# Patient Record
Sex: FEMALE | Race: WHITE | NOT HISPANIC OR LATINO | Employment: OTHER | URBAN - METROPOLITAN AREA
[De-identification: names, ages, dates, MRNs, and addresses within clinical notes are randomized per-mention and may not be internally consistent; named-entity substitution may affect disease eponyms.]

---

## 2018-10-05 ENCOUNTER — HOSPITAL ENCOUNTER (EMERGENCY)
Facility: HOSPITAL | Age: 53
Discharge: HOME | End: 2018-10-05
Attending: EMERGENCY MEDICINE | Admitting: EMERGENCY MEDICINE
Payer: MEDICARE

## 2018-10-05 ENCOUNTER — APPOINTMENT (EMERGENCY)
Dept: CT IMAGING | Facility: HOSPITAL | Age: 53
End: 2018-10-05
Payer: MEDICARE

## 2018-10-05 VITALS
TEMPERATURE: 98.3 F | SYSTOLIC BLOOD PRESSURE: 127 MMHG | BODY MASS INDEX: 24.27 KG/M2 | DIASTOLIC BLOOD PRESSURE: 72 MMHG | HEIGHT: 66 IN | HEART RATE: 60 BPM | OXYGEN SATURATION: 98 % | WEIGHT: 151 LBS | RESPIRATION RATE: 18 BRPM

## 2018-10-05 DIAGNOSIS — R91.1 LUNG NODULE: ICD-10-CM

## 2018-10-05 DIAGNOSIS — R10.9 RIGHT FLANK PAIN: Primary | ICD-10-CM

## 2018-10-05 DIAGNOSIS — N60.11 CYSTIC BREAST, RIGHT: ICD-10-CM

## 2018-10-05 DIAGNOSIS — T14.8XXA MUSCLE STRAIN: ICD-10-CM

## 2018-10-05 LAB
ALBUMIN SERPL BCP-MCNC: 3.9 G/DL (ref 3.5–5)
ALP SERPL-CCNC: 70 U/L (ref 46–116)
ALT SERPL W P-5'-P-CCNC: 41 U/L (ref 12–78)
ANION GAP SERPL CALCULATED.3IONS-SCNC: 10 MMOL/L (ref 4–13)
AST SERPL W P-5'-P-CCNC: 20 U/L (ref 5–45)
BASOPHILS # BLD AUTO: 0.04 THOUSANDS/ΜL (ref 0–0.1)
BASOPHILS NFR BLD AUTO: 1 % (ref 0–1)
BILIRUB SERPL-MCNC: 0.9 MG/DL (ref 0.2–1)
BILIRUB UR QL STRIP: ABNORMAL
BUN SERPL-MCNC: 15 MG/DL (ref 5–25)
CALCIUM SERPL-MCNC: 8.9 MG/DL (ref 8.3–10.1)
CHLORIDE SERPL-SCNC: 104 MMOL/L (ref 100–108)
CLARITY UR: ABNORMAL
CO2 SERPL-SCNC: 26 MMOL/L (ref 21–32)
COLOR UR: ABNORMAL
CREAT SERPL-MCNC: 0.86 MG/DL (ref 0.6–1.3)
EOSINOPHIL # BLD AUTO: 0.15 THOUSAND/ΜL (ref 0–0.61)
EOSINOPHIL NFR BLD AUTO: 2 % (ref 0–6)
ERYTHROCYTE [DISTWIDTH] IN BLOOD BY AUTOMATED COUNT: 13.2 % (ref 11.6–15.1)
GFR SERPL CREATININE-BSD FRML MDRD: 78 ML/MIN/1.73SQ M
GLUCOSE SERPL-MCNC: 107 MG/DL (ref 65–140)
GLUCOSE UR STRIP-MCNC: NEGATIVE MG/DL
HCT VFR BLD AUTO: 42.8 % (ref 34.8–46.1)
HGB BLD-MCNC: 14.7 G/DL (ref 11.5–15.4)
HGB UR QL STRIP.AUTO: NEGATIVE
IMM GRANULOCYTES # BLD AUTO: 0.03 THOUSAND/UL (ref 0–0.2)
IMM GRANULOCYTES NFR BLD AUTO: 0 % (ref 0–2)
KETONES UR STRIP-MCNC: NEGATIVE MG/DL
LEUKOCYTE ESTERASE UR QL STRIP: NEGATIVE
LIPASE SERPL-CCNC: 115 U/L (ref 73–393)
LYMPHOCYTES # BLD AUTO: 2.1 THOUSANDS/ΜL (ref 0.6–4.47)
LYMPHOCYTES NFR BLD AUTO: 30 % (ref 14–44)
MCH RBC QN AUTO: 33.3 PG (ref 26.8–34.3)
MCHC RBC AUTO-ENTMCNC: 34.3 G/DL (ref 31.4–37.4)
MCV RBC AUTO: 97 FL (ref 82–98)
MONOCYTES # BLD AUTO: 0.53 THOUSAND/ΜL (ref 0.17–1.22)
MONOCYTES NFR BLD AUTO: 8 % (ref 4–12)
NEUTROPHILS # BLD AUTO: 4.12 THOUSANDS/ΜL (ref 1.85–7.62)
NEUTS SEG NFR BLD AUTO: 59 % (ref 43–75)
NITRITE UR QL STRIP: NEGATIVE
NRBC BLD AUTO-RTO: 0 /100 WBCS
PH UR STRIP.AUTO: 6 [PH] (ref 4.5–8)
PLATELET # BLD AUTO: 236 THOUSANDS/UL (ref 149–390)
PMV BLD AUTO: 9.6 FL (ref 8.9–12.7)
POTASSIUM SERPL-SCNC: 3.2 MMOL/L (ref 3.5–5.3)
PROT SERPL-MCNC: 6.9 G/DL (ref 6.4–8.2)
PROT UR STRIP-MCNC: NEGATIVE MG/DL
RBC # BLD AUTO: 4.41 MILLION/UL (ref 3.81–5.12)
SODIUM SERPL-SCNC: 140 MMOL/L (ref 136–145)
SP GR UR STRIP.AUTO: >=1.03 (ref 1–1.03)
UROBILINOGEN UR QL STRIP.AUTO: 1 E.U./DL
WBC # BLD AUTO: 6.97 THOUSAND/UL (ref 4.31–10.16)

## 2018-10-05 PROCEDURE — 96374 THER/PROPH/DIAG INJ IV PUSH: CPT

## 2018-10-05 PROCEDURE — 99284 EMERGENCY DEPT VISIT MOD MDM: CPT

## 2018-10-05 PROCEDURE — 74177 CT ABD & PELVIS W/CONTRAST: CPT

## 2018-10-05 PROCEDURE — 71275 CT ANGIOGRAPHY CHEST: CPT

## 2018-10-05 PROCEDURE — 81003 URINALYSIS AUTO W/O SCOPE: CPT | Performed by: EMERGENCY MEDICINE

## 2018-10-05 PROCEDURE — 80053 COMPREHEN METABOLIC PANEL: CPT | Performed by: EMERGENCY MEDICINE

## 2018-10-05 PROCEDURE — 83690 ASSAY OF LIPASE: CPT | Performed by: EMERGENCY MEDICINE

## 2018-10-05 PROCEDURE — 85025 COMPLETE CBC W/AUTO DIFF WBC: CPT | Performed by: EMERGENCY MEDICINE

## 2018-10-05 PROCEDURE — 96361 HYDRATE IV INFUSION ADD-ON: CPT

## 2018-10-05 PROCEDURE — 36415 COLL VENOUS BLD VENIPUNCTURE: CPT | Performed by: EMERGENCY MEDICINE

## 2018-10-05 RX ORDER — KETOROLAC TROMETHAMINE 30 MG/ML
15 INJECTION, SOLUTION INTRAMUSCULAR; INTRAVENOUS ONCE
Status: COMPLETED | OUTPATIENT
Start: 2018-10-05 | End: 2018-10-05

## 2018-10-05 RX ORDER — LIDOCAINE 50 MG/G
1 PATCH TOPICAL ONCE
Status: DISCONTINUED | OUTPATIENT
Start: 2018-10-05 | End: 2018-10-05 | Stop reason: HOSPADM

## 2018-10-05 RX ORDER — POTASSIUM CHLORIDE 20 MEQ/1
40 TABLET, EXTENDED RELEASE ORAL ONCE
Status: COMPLETED | OUTPATIENT
Start: 2018-10-05 | End: 2018-10-05

## 2018-10-05 RX ORDER — ACETAMINOPHEN 325 MG/1
975 TABLET ORAL ONCE
Status: COMPLETED | OUTPATIENT
Start: 2018-10-05 | End: 2018-10-05

## 2018-10-05 RX ADMIN — LIDOCAINE 1 PATCH: 50 PATCH TOPICAL at 14:59

## 2018-10-05 RX ADMIN — IOHEXOL 100 ML: 350 INJECTION, SOLUTION INTRAVENOUS at 14:04

## 2018-10-05 RX ADMIN — SODIUM CHLORIDE 1000 ML: 0.9 INJECTION, SOLUTION INTRAVENOUS at 13:23

## 2018-10-05 RX ADMIN — POTASSIUM CHLORIDE 40 MEQ: 1500 TABLET, EXTENDED RELEASE ORAL at 14:59

## 2018-10-05 RX ADMIN — ACETAMINOPHEN 975 MG: 325 TABLET, FILM COATED ORAL at 14:59

## 2018-10-05 RX ADMIN — KETOROLAC TROMETHAMINE 15 MG: 30 INJECTION, SOLUTION INTRAMUSCULAR at 13:25

## 2018-10-05 NOTE — DISCHARGE INSTRUCTIONS
Incidental findings to follow up with PCP regarding:  - Left basilar 3 mm pulmonary nodule  Based on current Fleischner Society 2017 Guidelines on incidental pulmonary nodule, no routine follow-up is needed if the patient is considered low risk for lung cancer  If the patient is considered high risk for lung cancer, 12 month follow-up non-contrast chest CT is recommended  - Right lower breast lobulated cystic region measuring up to 2 3 x 3 3 x 3 4 cm  Correlate with mammography and possible ultrasound  Flank Pain   WHAT YOU NEED TO KNOW:   Flank pain is felt in the area below your ribcage and above your hip bones, often in the lower back  Your pain may be dull or so severe that you cannot get comfortable  The pain may stay in one area or radiate to another area  It may worsen and lighten in waves  Flank pain is often a sign of problems with your urinary tract, such as a kidney stone or infection  DISCHARGE INSTRUCTIONS:   Return to the emergency department if:   · You have a fever  · Your heart is fluttering or jumping  · You see blood in your urine  · Your pain radiates into your lower abdomen and genital area  · You have intense pain in your low back next to your spine  · You are much more tired than usual and have no desire to eat  · You have a headache and your muscles jerk  Contact your healthcare provider if:   · You have an upset stomach and are vomiting  · You have to urinate more often, and with urgency  · Your pain worsens or does not improve, and you cannot get comfortable  · You pass a stone when you urinate  · You have questions or concerns about your condition or care  Medicines: The following medicines may be ordered for you:  · Pain medicine  may help decrease or relieve your pain  Do not wait until the pain is severe before you take your medicine  · Antibiotics  may help treat a urinary tract infection caused by bacteria       · Take your medicine as directed  Contact your healthcare provider if you think your medicine is not helping or if you have side effects  Tell him of her if you are allergic to any medicine  Keep a list of the medicines, vitamins, and herbs you take  Include the amounts, and when and why you take them  Bring the list or the pill bottles to follow-up visits  Carry your medicine list with you in case of an emergency  Follow up with your healthcare provider in 1 to 2 days or as directed:  Write down your questions so you remember to ask them during your visits  © 2017 [employer] Information is for End User's use only and may not be sold, redistributed or otherwise used for commercial purposes  All illustrations and images included in CareNotes® are the copyrighted property of [employer]  or [employer]  The above information is an  only  It is not intended as medical advice for individual conditions or treatments  Talk to your doctor, nurse or pharmacist before following any medical regimen to see if it is safe and effective for you

## 2018-10-05 NOTE — ED PROVIDER NOTES
History  Chief Complaint   Patient presents with    Flank Pain     Pt reports right sided flank pain and diarrhea x5 days  Pt reports loss of her daughter 2 weeks prior  Pt reports it may be related to stress   Diarrhea     53-year-old female presenting for evaluation of right back/flank pain  Patient reports that symptoms started approximately 6 days ago  No inciting event, trauma or heavy lifting  She reports pain to right mid back/flank, does not radiate  Made worse with certain positions and movements  No alleviating factors  Never had similar pain in the past   Patient presents today because of the persistence of the symptoms, same severity over the past 6 days  Patient reports decreased appetite and not eating or drinking over the past few days  Denies any overt nausea  She does report numerous episodes of loose, nonbloody stools  Patient reports that she lost her daughter approximately 2 weeks ago and thought that this at first was a stress reaction, however symptoms have been persistent  Denies any fevers, chills, headache, CP, SOB, cough  She reports 1 episode of dysuria described as burning with urination yesterday, that resolved  Denies any hematuria, urgency or frequency  History of cholecystectomy, appendectomy  Lives with family at home  Current tobacco use  Denies any drug or alcohol use  A/P:  53-year-old female with right flank pain- will treat symptomatically, will get UA to rule out UTI/pyelonephritis, CBC to assess for leukocytosis/anemia, CMP to assess liver/renal function/electrolytes, lipase to rule pancreatitis, CTA to rule out PE/renal stone/other cause of patient's symptoms, reassess, dispo accordingl            None       History reviewed  No pertinent past medical history  Past Surgical History:   Procedure Laterality Date    APPENDECTOMY      CHOLECYSTECTOMY         History reviewed  No pertinent family history    I have reviewed and agree with the history as documented  Social History   Substance Use Topics    Smoking status: Current Every Day Smoker     Packs/day: 0 50     Types: Cigarettes    Smokeless tobacco: Never Used    Alcohol use No        Review of Systems   Constitutional: Positive for appetite change  Negative for chills and fever  HENT: Negative for rhinorrhea and sore throat  Respiratory: Negative for cough and shortness of breath  Cardiovascular: Negative for chest pain and leg swelling  Gastrointestinal: Negative for abdominal pain, constipation, diarrhea, nausea and vomiting  Endocrine: Negative for polydipsia, polyphagia and polyuria  Genitourinary: Positive for dysuria and flank pain  Negative for frequency, hematuria and urgency  Musculoskeletal: Negative for back pain, myalgias and neck pain  Skin: Negative for color change and rash  Allergic/Immunologic: Negative for environmental allergies and immunocompromised state  Neurological: Negative for dizziness, weakness, light-headedness, numbness and headaches  Hematological: Negative for adenopathy  Does not bruise/bleed easily  Psychiatric/Behavioral: Negative for agitation and confusion  All other systems reviewed and are negative  Physical Exam  Physical Exam   Constitutional: She is oriented to person, place, and time  She appears well-developed and well-nourished  HENT:   Head: Normocephalic and atraumatic  Nose: Nose normal    Mouth/Throat: Oropharynx is clear and moist    Eyes: Conjunctivae and EOM are normal    Neck: Normal range of motion  Neck supple  Cardiovascular: Normal rate, regular rhythm, normal heart sounds and intact distal pulses  Pulmonary/Chest: Effort normal and breath sounds normal  No respiratory distress  She has no wheezes  She has no rales  She exhibits no tenderness  Abdominal: Soft  She exhibits no distension and no mass  There is no tenderness  There is no guarding  No ttp   Cannot reproduce pt's pain with palpation on exam  No skin changes/rash  No CVA ttp  Musculoskeletal: She exhibits no edema or deformity  Neurological: She is alert and oriented to person, place, and time  She exhibits normal muscle tone  Coordination normal    Skin: Skin is warm and dry  No rash noted  Psychiatric: She has a normal mood and affect  Her behavior is normal    Nursing note and vitals reviewed        Vital Signs  ED Triage Vitals   Temperature Pulse Respirations Blood Pressure SpO2   10/05/18 1259 10/05/18 1259 10/05/18 1259 10/05/18 1259 10/05/18 1259   98 3 °F (36 8 °C) 68 19 133/85 97 %      Temp Source Heart Rate Source Patient Position - Orthostatic VS BP Location FiO2 (%)   10/05/18 1259 10/05/18 1259 10/05/18 1259 10/05/18 1259 --   Oral Monitor Lying Right arm       Pain Score       10/05/18 1419       6           Vitals:    10/05/18 1259 10/05/18 1500   BP: 133/85 127/72   Pulse: 68 60   Patient Position - Orthostatic VS: Lying Lying       Visual Acuity      ED Medications  Medications   lidocaine (LIDODERM) 5 % patch 1 patch (1 patch Topical Medication Applied 10/5/18 1459)   sodium chloride 0 9 % bolus 1,000 mL (0 mL Intravenous Stopped 10/5/18 1423)   ketorolac (TORADOL) injection 15 mg (15 mg Intravenous Given 10/5/18 1325)   potassium chloride (K-DUR,KLOR-CON) CR tablet 40 mEq (40 mEq Oral Given 10/5/18 1459)   iohexol (OMNIPAQUE) 350 MG/ML injection (MULTI-DOSE) 100 mL (100 mL Intravenous Given 10/5/18 1404)   acetaminophen (TYLENOL) tablet 975 mg (975 mg Oral Given 10/5/18 1459)       Diagnostic Studies  Results Reviewed     Procedure Component Value Units Date/Time    Comprehensive metabolic panel [[id number]]  (Abnormal) Collected:  10/05/18 1321    Lab Status:  Final result Specimen:  Blood from Arm, Left Updated:  10/05/18 1347     Sodium 140 mmol/L      Potassium 3 2 (L) mmol/L      Chloride 104 mmol/L      CO2 26 mmol/L      ANION GAP 10 mmol/L      BUN 15 mg/dL      Creatinine 0 86 mg/dL      Glucose 107 mg/dL Calcium 8 9 mg/dL      AST 20 U/L      ALT 41 U/L      Alkaline Phosphatase 70 U/L      Total Protein 6 9 g/dL      Albumin 3 9 g/dL      Total Bilirubin 0 90 mg/dL      eGFR 78 ml/min/1 73sq m     Narrative:         National Kidney Disease Education Program recommendations are as follows:  GFR calculation is accurate only with a steady state creatinine  Chronic Kidney disease less than 60 ml/min/1 73 sq  meters  Kidney failure less than 15 ml/min/1 73 sq  meters      Lipase [[id number]]  (Normal) Collected:  10/05/18 1321    Lab Status:  Final result Specimen:  Blood from Arm, Left Updated:  10/05/18 1347     Lipase 115 u/L     Urinalysis with reflex to microscopic [[id number]]  (Abnormal) Collected:  10/05/18 1325    Lab Status:  Final result Specimen:  Urine from Urine, Clean Catch Updated:  10/05/18 1332     Color, UA Becky     Clarity, UA Slightly Cloudy     Specific Gravity, UA >=1 030     pH, UA 6 0     Leukocytes, UA Negative     Nitrite, UA Negative     Protein, UA Negative mg/dl      Glucose, UA Negative mg/dl      Ketones, UA Negative mg/dl      Urobilinogen, UA 1 0 E U /dl      Bilirubin, UA Small (A)     Blood, UA Negative    CBC and differential [[id number]] Collected:  10/05/18 1321    Lab Status:  Final result Specimen:  Blood from Arm, Left Updated:  10/05/18 1327     WBC 6 97 Thousand/uL      RBC 4 41 Million/uL      Hemoglobin 14 7 g/dL      Hematocrit 42 8 %      MCV 97 fL      MCH 33 3 pg      MCHC 34 3 g/dL      RDW 13 2 %      MPV 9 6 fL      Platelets 796 Thousands/uL      nRBC 0 /100 WBCs      Neutrophils Relative 59 %      Immat GRANS % 0 %      Lymphocytes Relative 30 %      Monocytes Relative 8 %      Eosinophils Relative 2 %      Basophils Relative 1 %      Neutrophils Absolute 4 12 Thousands/µL      Immature Grans Absolute 0 03 Thousand/uL      Lymphocytes Absolute 2 10 Thousands/µL      Monocytes Absolute 0 53 Thousand/µL      Eosinophils Absolute 0 15 Thousand/µL      Basophils Absolute 0 04 Thousands/µL                  PE Study with CT abdomen & pelvis with contrast   ED Interpretation by Jono Martinez DO (10/05 8404)   Study Result       CT PULMONARY ANGIOGRAM OF THE CHEST AND CT ABDOMEN AND PELVIS WITH INTRAVENOUS CONTRAST       INDICATION:   R back/flank pain        COMPARISON:  None        TECHNIQUE:  CT examination of the chest, abdomen and pelvis was performed  Thin section CT angiographic technique was used in the chest in order to evaluate for pulmonary embolus and coronal 3D MIP postprocessing was performed on the acquisition    scanner  Axial, sagittal, and coronal 2D reformatted images were created from the source data and submitted for interpretation        Radiation dose length product (DLP) for this visit:  793 mGy-cm   This examination, like all CT scans performed in the [facility], was performed utilizing techniques to minimize radiation dose exposure, including the use of iterative    reconstruction and automated exposure control        IV Contrast:  100 mL of iohexol (OMNIPAQUE)   Enteric Contrast:  Enteric contrast was not administered        FINDINGS:       CHEST       PULMONARY ARTERIAL TREE:  No pulmonary embolus is seen        LUNGS:  Left basilar 3 mm pulmonary nodule on 4/31        PLEURA:  Unremarkable        HEART/AORTA:  Unremarkable for patient's age        MEDIASTINUM AND LATONIA:  Unremarkable        CHEST WALL AND LOWER NECK:   Right inferior breast lobulated cystic lesion measuring up to 2 3 x 3 3 x 3 4 cm  Correlate with mammogram        ABDOMEN       LIVER/BILIARY TREE:  Enlarged fatty liver noted        GALLBLADDER:  No calcified gallstones  No pericholecystic inflammatory change        SPLEEN:  Splenomegaly noted        PANCREAS:  Unremarkable        ADRENAL GLANDS:  Unremarkable        KIDNEYS/URETERS:  Unremarkable   No hydronephrosis        STOMACH AND BOWEL:  Unremarkable        APPENDIX:  No findings to suggest appendicitis        ABDOMINOPELVIC CAVITY:  No ascites or free intraperitoneal air  No lymphadenopathy        VESSELS:  Unremarkable for patient's age        PELVIS       REPRODUCTIVE ORGANS:  Unremarkable for patient's age        URINARY BLADDER:  Unremarkable        ABDOMINAL WALL/INGUINAL REGIONS:  Unremarkable        OSSEOUS STRUCTURES:  Thoracic dextroscoliosis evident  L4-S1 dorsal fusion with discectomy        IMPRESSION:       1  No PE  2   Left basilar 3 mm pulmonary nodule  Based on current Fleischner Society 2017 Guidelines on incidental pulmonary nodule, no routine follow-up is needed if the patient is considered low risk for lung cancer  If the patient is considered high risk for    lung cancer, 12 month follow-up non-contrast chest CT is recommended  3   Right lower breast lobulated cystic region measuring up to 2 3 x 3 3 x 3 4 cm  Correlate with mammography and possible ultrasound  4   Hepatosplenomegaly with fatty infiltration of the liver  Final Result by Maria Elena Valencia MD (10/05 1500)      1  No PE  2   Left basilar 3 mm pulmonary nodule  Based on current Fleischner Society 2017 Guidelines on incidental pulmonary nodule, no routine follow-up is needed if the patient is considered low risk for lung cancer  If the patient is considered high risk for    lung cancer, 12 month follow-up non-contrast chest CT is recommended  3   Right lower breast lobulated cystic region measuring up to 2 3 x 3 3 x 3 4 cm  Correlate with mammography and possible ultrasound  4   Hepatosplenomegaly with fatty infiltration of the liver  The study was marked in EPIC for significant notification  Workstation performed: [id number]                    Procedures  Procedures       Phone Contacts  ED Phone Contact    ED Course  ED Course as of Oct 05 1620   Fri Oct 05, 2018   1356 Will replete Potassium: (!) 3 2   1424 Pt reassessed, pain still same  Discussed urine/labs  Pending CT scan read  MDM  Number of Diagnoses or Management Options  Cystic breast, right:   Lung nodule:   Muscle strain:   Right flank pain:   Diagnosis management comments: 47 yo F with R flank pain, likely MSK  Unremarkable ED workup for cause of pain, pain controlled at time of discharged, likely MSK in nature  Incidental findings of lung nodule and cystic lesion to breast discussed with pt  Given information to establish care with PCP in the area  Strict return precautions discussed       Amount and/or Complexity of Data Reviewed  Clinical lab tests: ordered and reviewed  Tests in the radiology section of CPT®: ordered and reviewed  Tests in the medicine section of CPT®: ordered and reviewed      CritCare Time    Disposition  Final diagnoses:   Right flank pain   Muscle strain   Lung nodule   Cystic breast, right     Time reflects when diagnosis was documented in both MDM as applicable and the Disposition within this note     Time User Action Codes Description Comment    10/5/2018  3:08 PM Glory Spitz A Add [R10 9] Right flank pain     10/5/2018  3:08 PM Mariaelena Pierre 52  8XXA] Muscle strain     10/5/2018  3:08 PM Glory Spitz A Add [R91 1] Lung nodule     10/5/2018  3:09 PM Glory Spitz A Add [N60 11] Cystic breast, right       ED Disposition     ED Disposition Condition Comment    Discharge  9 [facility] discharge to home/self care  Condition at discharge: Stable        Follow-up Information     Follow up With Specialties Details Why Contact Info    Enrico Zamora PA-C Internal Medicine, Physician Assistant Schedule an appointment as soon as possible for a visit  [phone] E  [address] [place] [id number]  [phone]          Return to ED if any new/worsening symptoms  Take 400 mg Ibuprofen every 6 hours and 650 mg Tylenol every 8 hours as needed for pain          There are no discharge medications for this patient  No discharge procedures on file      ED Provider  Electronically Signed by           Newton Sanford DO  10/05/18 8142

## 2024-03-01 ENCOUNTER — NEW REFERRAL (OUTPATIENT)
Dept: URBAN - METROPOLITAN AREA CLINIC 77 | Facility: CLINIC | Age: 59
End: 2024-03-01

## 2024-03-01 DIAGNOSIS — H43.813: ICD-10-CM

## 2024-03-01 PROCEDURE — 99204 OFFICE O/P NEW MOD 45 MIN: CPT

## 2024-03-01 PROCEDURE — 92250 FUNDUS PHOTOGRAPHY W/I&R: CPT

## 2024-03-01 ASSESSMENT — VISUAL ACUITY
OS_SC: 20/30-1
OD_SC: 20/40-1

## 2024-03-01 ASSESSMENT — TONOMETRY
OS_IOP_MMHG: 10
OD_IOP_MMHG: 12